# Patient Record
Sex: MALE | Race: WHITE | NOT HISPANIC OR LATINO | Employment: FULL TIME | URBAN - METROPOLITAN AREA
[De-identification: names, ages, dates, MRNs, and addresses within clinical notes are randomized per-mention and may not be internally consistent; named-entity substitution may affect disease eponyms.]

---

## 2022-09-22 ENCOUNTER — OFFICE VISIT (OUTPATIENT)
Dept: UROLOGY | Facility: CLINIC | Age: 42
End: 2022-09-22
Payer: OTHER GOVERNMENT

## 2022-09-22 VITALS
WEIGHT: 315 LBS | HEIGHT: 69 IN | DIASTOLIC BLOOD PRESSURE: 65 MMHG | TEMPERATURE: 98 F | RESPIRATION RATE: 18 BRPM | SYSTOLIC BLOOD PRESSURE: 122 MMHG | BODY MASS INDEX: 46.65 KG/M2 | HEART RATE: 113 BPM

## 2022-09-22 DIAGNOSIS — Z30.2 ENCOUNTER FOR STERILIZATION IN MALE: Primary | ICD-10-CM

## 2022-09-22 PROCEDURE — 99204 PR OFFICE/OUTPT VISIT, NEW, LEVL IV, 45-59 MIN: ICD-10-PCS | Mod: S$GLB,,, | Performed by: UROLOGY

## 2022-09-22 PROCEDURE — 99204 OFFICE O/P NEW MOD 45 MIN: CPT | Mod: S$GLB,,, | Performed by: UROLOGY

## 2022-09-22 RX ORDER — DICYCLOMINE HYDROCHLORIDE 20 MG/1
10 TABLET ORAL EVERY 6 HOURS
COMMUNITY

## 2022-09-22 RX ORDER — CHOLECALCIFEROL (VITAMIN D3) 50 MCG
CAPSULE ORAL
COMMUNITY

## 2022-09-22 RX ORDER — ARIPIPRAZOLE 10 MG/1
5 TABLET ORAL DAILY
COMMUNITY

## 2022-09-22 NOTE — PROGRESS NOTES
"Subjective:       Patient ID: Jerry Mendieta is a 42 y.o. male.    Chief Complaint: Sterilization      HPI:  42-year-old male with 1 child desires sterility    Past Medical History: History reviewed. No pertinent past medical history.    Past Surgical Historical:   Past Surgical History:   Procedure Laterality Date    APPENDECTOMY      vein removal Left     vein removal from left foot        Medications:   Medication List with Changes/Refills   Current Medications    ARIPIPRAZOLE (ABILIFY) 10 MG TAB    Take 5 mg by mouth once daily.    DICYCLOMINE (BENTYL) 20 MG TABLET    Take 10 mg by mouth every 6 (six) hours.    LACTOBAC NO.41/BIFIDOBACT NO.7 (PROBIOTIC-10 ORAL)    Take by mouth.    OMEGA 3-DHA-EPA-FISH OIL (FISH OIL) 100-160-1,000 MG CAP    Take by mouth.        Past Social History:   Social History     Socioeconomic History    Marital status: Single   Tobacco Use    Smoking status: Never    Smokeless tobacco: Never   Substance and Sexual Activity    Alcohol use: Yes     Comment: rarely    Sexual activity: Yes     Partners: Female     Birth control/protection: None       Allergies:   Review of patient's allergies indicates:   Allergen Reactions    Phenergan [promethazine] Other (See Comments)     Made pt "speed"         Family History:   Family History   Adopted: Yes   Problem Relation Age of Onset    Alcohol abuse Father     No Known Problems Mother         Review of Systems:  Review of Systems    Physical Exam:  Physical Exam    Assessment/Plan:       Problem List Items Addressed This Visit    None  Visit Diagnoses       Encounter for sterilization in male    -  Primary    Relevant Orders    Vasectomy               We had a long discussion regarding vasectomy including the risks and benefits.  The patient understands the permanent nature of the procedure he also understands the potential risks of vasectomy including but not limited to injury to the testicle loss of testicle bleeding hematoma " infection and testicular atrophy.  Patient understands these risks is willing to proceed we will schedule vasectomy next available date.